# Patient Record
Sex: MALE | Race: WHITE | NOT HISPANIC OR LATINO | Employment: UNEMPLOYED | URBAN - NONMETROPOLITAN AREA
[De-identification: names, ages, dates, MRNs, and addresses within clinical notes are randomized per-mention and may not be internally consistent; named-entity substitution may affect disease eponyms.]

---

## 2019-03-17 PROCEDURE — 99284 EMERGENCY DEPT VISIT MOD MDM: CPT | Mod: 25 | Performed by: FAMILY MEDICINE

## 2019-03-17 PROCEDURE — 96374 THER/PROPH/DIAG INJ IV PUSH: CPT | Performed by: FAMILY MEDICINE

## 2019-03-17 PROCEDURE — 99283 EMERGENCY DEPT VISIT LOW MDM: CPT | Mod: Z6 | Performed by: FAMILY MEDICINE

## 2019-03-17 PROCEDURE — 96375 TX/PRO/DX INJ NEW DRUG ADDON: CPT | Performed by: FAMILY MEDICINE

## 2019-03-17 ASSESSMENT — MIFFLIN-ST. JEOR: SCORE: 1676.33

## 2019-03-18 ENCOUNTER — APPOINTMENT (OUTPATIENT)
Dept: CT IMAGING | Facility: OTHER | Age: 54
End: 2019-03-18
Attending: FAMILY MEDICINE

## 2019-03-18 ENCOUNTER — HOSPITAL ENCOUNTER (EMERGENCY)
Facility: OTHER | Age: 54
Discharge: HOME | End: 2019-03-18
Attending: FAMILY MEDICINE | Admitting: FAMILY MEDICINE

## 2019-03-18 VITALS
HEART RATE: 52 BPM | WEIGHT: 190 LBS | HEIGHT: 68 IN | TEMPERATURE: 98.3 F | OXYGEN SATURATION: 95 % | SYSTOLIC BLOOD PRESSURE: 138 MMHG | BODY MASS INDEX: 28.79 KG/M2 | DIASTOLIC BLOOD PRESSURE: 84 MMHG | RESPIRATION RATE: 15 BRPM

## 2019-03-18 DIAGNOSIS — S39.012A STRAIN OF LUMBAR REGION, INITIAL ENCOUNTER: ICD-10-CM

## 2019-03-18 LAB
ALBUMIN SERPL-MCNC: 4 G/DL (ref 3.5–5.7)
ALBUMIN UR-MCNC: NEGATIVE MG/DL
ALP SERPL-CCNC: 47 U/L (ref 34–104)
ALT SERPL W P-5'-P-CCNC: 20 U/L (ref 7–52)
ANION GAP SERPL CALCULATED.3IONS-SCNC: 7 MMOL/L (ref 3–14)
APPEARANCE UR: CLEAR
AST SERPL W P-5'-P-CCNC: 13 U/L (ref 13–39)
BASOPHILS # BLD AUTO: 0.1 10E9/L (ref 0–0.2)
BASOPHILS NFR BLD AUTO: 1.2 %
BILIRUB SERPL-MCNC: 0.3 MG/DL (ref 0.3–1)
BILIRUB UR QL STRIP: NEGATIVE
BUN SERPL-MCNC: 26 MG/DL (ref 7–25)
CALCIUM SERPL-MCNC: 8.9 MG/DL (ref 8.6–10.3)
CHLORIDE SERPL-SCNC: 106 MMOL/L (ref 98–107)
CO2 SERPL-SCNC: 27 MMOL/L (ref 21–31)
COLOR UR AUTO: YELLOW
CREAT SERPL-MCNC: 0.83 MG/DL (ref 0.7–1.3)
DIFFERENTIAL METHOD BLD: NORMAL
EOSINOPHIL # BLD AUTO: 0.6 10E9/L (ref 0–0.7)
EOSINOPHIL NFR BLD AUTO: 5.5 %
ERYTHROCYTE [DISTWIDTH] IN BLOOD BY AUTOMATED COUNT: 13.1 % (ref 10–15)
GFR SERPL CREATININE-BSD FRML MDRD: >90 ML/MIN/{1.73_M2}
GLUCOSE SERPL-MCNC: 109 MG/DL (ref 70–105)
GLUCOSE UR STRIP-MCNC: NEGATIVE MG/DL
HCT VFR BLD AUTO: 46.4 % (ref 40–53)
HGB BLD-MCNC: 15.2 G/DL (ref 13.3–17.7)
HGB UR QL STRIP: NEGATIVE
IMM GRANULOCYTES # BLD: 0 10E9/L (ref 0–0.4)
IMM GRANULOCYTES NFR BLD: 0.3 %
KETONES UR STRIP-MCNC: NEGATIVE MG/DL
LEUKOCYTE ESTERASE UR QL STRIP: NEGATIVE
LYMPHOCYTES # BLD AUTO: 4.2 10E9/L (ref 0.8–5.3)
LYMPHOCYTES NFR BLD AUTO: 40.8 %
MCH RBC QN AUTO: 30.1 PG (ref 26.5–33)
MCHC RBC AUTO-ENTMCNC: 32.8 G/DL (ref 31.5–36.5)
MCV RBC AUTO: 92 FL (ref 78–100)
MONOCYTES # BLD AUTO: 1.1 10E9/L (ref 0–1.3)
MONOCYTES NFR BLD AUTO: 10.7 %
NEUTROPHILS # BLD AUTO: 4.3 10E9/L (ref 1.6–8.3)
NEUTROPHILS NFR BLD AUTO: 41.5 %
NITRATE UR QL: NEGATIVE
PH UR STRIP: 6 PH (ref 5–9)
PLATELET # BLD AUTO: 215 10E9/L (ref 150–450)
POTASSIUM SERPL-SCNC: 4.5 MMOL/L (ref 3.5–5.1)
PROT SERPL-MCNC: 7 G/DL (ref 6.4–8.9)
RBC # BLD AUTO: 5.05 10E12/L (ref 4.4–5.9)
SODIUM SERPL-SCNC: 140 MMOL/L (ref 134–144)
SOURCE: NORMAL
SP GR UR STRIP: 1.01 (ref 1–1.03)
UROBILINOGEN UR STRIP-ACNC: 0.2 EU/DL (ref 0.2–1)
WBC # BLD AUTO: 10.4 10E9/L (ref 4–11)

## 2019-03-18 PROCEDURE — 36415 COLL VENOUS BLD VENIPUNCTURE: CPT | Performed by: FAMILY MEDICINE

## 2019-03-18 PROCEDURE — 96374 THER/PROPH/DIAG INJ IV PUSH: CPT | Performed by: FAMILY MEDICINE

## 2019-03-18 PROCEDURE — 81003 URINALYSIS AUTO W/O SCOPE: CPT | Performed by: FAMILY MEDICINE

## 2019-03-18 PROCEDURE — 74176 CT ABD & PELVIS W/O CONTRAST: CPT | Mod: TC

## 2019-03-18 PROCEDURE — 25000128 H RX IP 250 OP 636: Performed by: FAMILY MEDICINE

## 2019-03-18 PROCEDURE — 80053 COMPREHEN METABOLIC PANEL: CPT | Performed by: FAMILY MEDICINE

## 2019-03-18 PROCEDURE — 85025 COMPLETE CBC W/AUTO DIFF WBC: CPT | Performed by: FAMILY MEDICINE

## 2019-03-18 PROCEDURE — 96375 TX/PRO/DX INJ NEW DRUG ADDON: CPT | Performed by: FAMILY MEDICINE

## 2019-03-18 RX ORDER — ONDANSETRON 2 MG/ML
8 INJECTION INTRAMUSCULAR; INTRAVENOUS ONCE
Status: COMPLETED | OUTPATIENT
Start: 2019-03-18 | End: 2019-03-18

## 2019-03-18 RX ORDER — CYCLOBENZAPRINE HCL 10 MG
10 TABLET ORAL 3 TIMES DAILY PRN
Qty: 15 TABLET | Refills: 0 | Status: SHIPPED | OUTPATIENT
Start: 2019-03-18 | End: 2019-03-24

## 2019-03-18 RX ORDER — MORPHINE SULFATE 4 MG/ML
4 INJECTION, SOLUTION INTRAMUSCULAR; INTRAVENOUS
Status: DISCONTINUED | OUTPATIENT
Start: 2019-03-18 | End: 2019-03-18 | Stop reason: HOSPADM

## 2019-03-18 RX ORDER — KETOROLAC TROMETHAMINE 10 MG/1
10 TABLET, FILM COATED ORAL EVERY 6 HOURS PRN
Qty: 20 TABLET | Refills: 0 | Status: SHIPPED | OUTPATIENT
Start: 2019-03-18

## 2019-03-18 RX ORDER — PREDNISONE 20 MG/1
40 TABLET ORAL DAILY
Qty: 12 TABLET | Refills: 0 | Status: SHIPPED | OUTPATIENT
Start: 2019-03-18 | End: 2019-03-22

## 2019-03-18 RX ORDER — KETOROLAC TROMETHAMINE 15 MG/ML
15 INJECTION, SOLUTION INTRAMUSCULAR; INTRAVENOUS ONCE
Status: COMPLETED | OUTPATIENT
Start: 2019-03-18 | End: 2019-03-18

## 2019-03-18 RX ADMIN — ONDANSETRON 8 MG: 2 INJECTION INTRAMUSCULAR; INTRAVENOUS at 00:25

## 2019-03-18 RX ADMIN — KETOROLAC TROMETHAMINE 15 MG: 15 INJECTION, SOLUTION INTRAMUSCULAR; INTRAVENOUS at 00:28

## 2019-03-18 RX ADMIN — SODIUM CHLORIDE 1000 ML: 9 INJECTION, SOLUTION INTRAVENOUS at 00:24

## 2019-03-18 ASSESSMENT — ENCOUNTER SYMPTOMS
COUGH: 0
SHORTNESS OF BREATH: 0
DIARRHEA: 0
DIAPHORESIS: 0
HEADACHES: 0
FACIAL SWELLING: 0
VOMITING: 0
DYSURIA: 0
CHILLS: 0
ABDOMINAL PAIN: 0
FLANK PAIN: 1
LIGHT-HEADEDNESS: 0
PALPITATIONS: 0
SORE THROAT: 0
NAUSEA: 1
BACK PAIN: 1
FEVER: 0
DIFFICULTY URINATING: 0

## 2019-03-18 NOTE — ED PROVIDER NOTES
History     Chief Complaint   Patient presents with     Flank Pain     HPI  Alf Muniz is a 54 year old male who presnts to ED with left flank pain. Patient reports the pain began 22 hours ago and has worsened. The pain is located in the left flank area and described as an achy, 7 out of 10 for severity. The pain is exacerbated by movement and relieved by nothing. The patient complains of associated nausea. Denies associated, sweats, chills, URI symptoms, cough, chest pain, shortness of breath, abdominal pain, vomiting, diarrhea, dysuria, hematuria.  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  He states that the pain has been in the same area but has never been this severe before.. Patient denies other complaints    Allergies:  Allergies   Allergen Reactions     Penicillins Unknown       Problem List:    There are no active problems to display for this patient.       Past Medical History:    History reviewed. No pertinent past medical history.    Past Surgical History:    History reviewed. No pertinent surgical history.    Family History:    History reviewed. No pertinent family history.    Social History:  Marital Status:  Single [1]  Social History     Tobacco Use     Smoking status: Current Every Day Smoker     Packs/day: 0.50     Smokeless tobacco: Never Used   Substance Use Topics     Alcohol use: Yes     Comment: occasionally beer     Drug use: No        Medications:      cyclobenzaprine (FLEXERIL) 10 MG tablet   ketorolac (TORADOL) 10 MG tablet   predniSONE (DELTASONE) 20 MG tablet         Review of Systems   Constitutional: Negative for chills, diaphoresis and fever.   HENT: Negative for congestion, facial swelling and sore throat.    Eyes: Negative for visual disturbance.   Respiratory: Negative for cough and shortness of breath.    Cardiovascular: Negative for chest pain, palpitations and leg swelling.   Gastrointestinal: Positive for  "nausea. Negative for abdominal pain, diarrhea and vomiting.   Genitourinary: Positive for flank pain. Negative for difficulty urinating, dysuria and urgency.   Musculoskeletal: Positive for back pain.   Skin: Negative for rash.   Neurological: Negative for light-headedness and headaches.   All other systems reviewed and are negative.      Physical Exam   BP: (!) 186/94  Pulse: 64  Heart Rate: 67  Temp: 97.3  F (36.3  C)  Resp: 15  Height: 172.7 cm (5' 8\")  Weight: 86.2 kg (190 lb)  SpO2: 97 %      Physical Exam   Constitutional: He is oriented to person, place, and time. He appears well-developed and well-nourished. He is cooperative. He does not appear ill. No distress.   HENT:   Head: Normocephalic and atraumatic.   Right Ear: External ear normal.   Left Ear: External ear normal.   Nose: Nose normal.   Mouth/Throat: Oropharynx is clear and moist.   Eyes: Conjunctivae and EOM are normal. Pupils are equal, round, and reactive to light.   Neck: Normal range of motion. Neck supple.   Cardiovascular: Normal rate, regular rhythm, normal heart sounds and intact distal pulses.   No murmur heard.  Pulmonary/Chest: Effort normal and breath sounds normal. He has no rales.   Abdominal: Soft. Bowel sounds are normal. There is no hepatosplenomegaly. There is no tenderness. There is CVA tenderness. There is no rigidity, no rebound, no guarding, no tenderness at McBurney's point and negative Smith's sign.   Musculoskeletal: Normal range of motion. He exhibits no edema or tenderness.        Back:    Lymphadenopathy:     He has no cervical adenopathy.   Neurological: He is alert and oriented to person, place, and time.   Skin: Skin is warm. Capillary refill takes less than 2 seconds. No rash noted. He is not diaphoretic.   Nursing note and vitals reviewed.      ED Course     Procedures  Critical Care time:  none  Results for orders placed or performed during the hospital encounter of 03/18/19 (from the past 24 hour(s))   CBC with " platelets differential   Result Value Ref Range    WBC 10.4 4.0 - 11.0 10e9/L    RBC Count 5.05 4.4 - 5.9 10e12/L    Hemoglobin 15.2 13.3 - 17.7 g/dL    Hematocrit 46.4 40.0 - 53.0 %    MCV 92 78 - 100 fl    MCH 30.1 26.5 - 33.0 pg    MCHC 32.8 31.5 - 36.5 g/dL    RDW 13.1 10.0 - 15.0 %    Platelet Count 215 150 - 450 10e9/L    Diff Method Automated Method     % Neutrophils 41.5 %    % Lymphocytes 40.8 %    % Monocytes 10.7 %    % Eosinophils 5.5 %    % Basophils 1.2 %    % Immature Granulocytes 0.3 %    Absolute Neutrophil 4.3 1.6 - 8.3 10e9/L    Absolute Lymphocytes 4.2 0.8 - 5.3 10e9/L    Absolute Monocytes 1.1 0.0 - 1.3 10e9/L    Absolute Eosinophils 0.6 0.0 - 0.7 10e9/L    Absolute Basophils 0.1 0.0 - 0.2 10e9/L    Abs Immature Granulocytes 0.0 0 - 0.4 10e9/L   Comprehensive metabolic panel   Result Value Ref Range    Sodium 140 134 - 144 mmol/L    Potassium 4.5 3.5 - 5.1 mmol/L    Chloride 106 98 - 107 mmol/L    Carbon Dioxide 27 21 - 31 mmol/L    Anion Gap 7 3 - 14 mmol/L    Glucose 109 (H) 70 - 105 mg/dL    Urea Nitrogen 26 (H) 7 - 25 mg/dL    Creatinine 0.83 0.70 - 1.30 mg/dL    GFR Estimate >90 >60 mL/min/[1.73_m2]    GFR Estimate If Black >90 >60 mL/min/[1.73_m2]    Calcium 8.9 8.6 - 10.3 mg/dL    Bilirubin Total 0.3 0.3 - 1.0 mg/dL    Albumin 4.0 3.5 - 5.7 g/dL    Protein Total 7.0 6.4 - 8.9 g/dL    Alkaline Phosphatase 47 34 - 104 U/L    ALT 20 7 - 52 U/L    AST 13 13 - 39 U/L   CT Abdomen Pelvis without Contrast (stone protocol)    Narrative    EXAM:    CT Abdomen and Pelvis Without Contrast     EXAM DATE/TIME:    3/18/2019 12:54 AM     CLINICAL HISTORY:    54 years old, male; Signs and symptoms; Other: Flank pain, stone disease   suspected     TECHNIQUE:    Axial computed tomography images of the abdomen and pelvis without contrast.    All CT scans at this facility use at least one of these dose optimization   techniques: automated exposure control; mA and/or kV adjustment per patient   size (includes  targeted exams where dose is matched to clinical indication); or   iterative reconstruction.    Coronal and sagittal reformatted images were created and reviewed.     COMPARISON:    No relevant prior studies available.     FINDINGS:    Lower thorax: No acute findings.     ABDOMEN:    Liver: Normal. No mass.    Gallbladder and bile ducts: Normal. No calcified stones. No ductal dilation.    Pancreas: Normal. No ductal dilation.    Spleen: Normal. No splenomegaly.    Adrenals: Normal. No mass.    Kidneys and ureters: The kidneys are normal size. Renal cortical cyst   involving the right kidney measuring 3 cm in length. This has a density of 12   Hounsfield units. No hydronephrosis or nephrolithiasis. No hydroureter.   Stomach and bowel: Normal. No obstruction. No mucosal thickening. Occasional   colonic diverticuli. No evidence of acute diverticulitis.  Appendix: No evidence of appendicitis.     PELVIS:    Bladder: Unremarkable as visualized.    Reproductive: Unremarkable as visualized.     ABDOMEN and PELVIS:    Intraperitoneal space: Normal. No free air. No significant fluid collection.    Bones/joints: No acute fracture. No dislocation.    Soft tissues: Right-sided hydrocele.   Vasculature: Normal. No abdominal aortic aneurysm.    Lymph nodes: Normal. No enlarged lymph nodes.       Impression    IMPRESSION:   1. No evidence of hydronephrosis or nephrolithiasis.  2. Right hydrocele.    THIS DOCUMENT HAS BEEN ELECTRONICALLY SIGNED BY ETHAN DICKINSON MD   UA reflex to Microscopic and Culture   Result Value Ref Range    Color Urine Yellow     Appearance Urine Clear     Glucose Urine Negative NEG^Negative mg/dL    Bilirubin Urine Negative NEG^Negative    Ketones Urine Negative NEG^Negative mg/dL    Specific Gravity Urine 1.015 1.000 - 1.030    Blood Urine Negative NEG^Negative    pH Urine 6.0 5.0 - 9.0 pH    Protein Albumin Urine Negative NEG^Negative mg/dL    Urobilinogen Urine 0.2 0.2 - 1.0 EU/dL    Nitrite Urine Negative  NEG^Negative    Leukocyte Esterase Urine Negative NEG^Negative    Source Midstream Urine        Medications   morphine (PF) injection 4 mg (not administered)   0.9% sodium chloride BOLUS (0 mLs Intravenous Stopped 3/18/19 0244)   ondansetron (ZOFRAN) injection 8 mg (8 mg Intravenous Given 3/18/19 0025)   ketorolac (TORADOL) injection 15 mg (15 mg Intravenous Given 3/18/19 0028)       I had a discussion with the patient and the family regarding the symptoms, exam, laboratory, CT Scan results, diagnosis, and plan.  Labs are unremarkable.  CT scan is negative for stone disease or other acute findings.  Findings are consistent with lumbar myofascial strain.  The patient is discharged home in good condition.  Follow-up with primary care physician in 3-5 days.  Ice or heat to the area, stretching, he is given a prescription for Toradol, prednisone, Flexeril.  He may also use capsaicin cream which is available over-the-counter.  I answered all questions to the best of my ability.    The patient voiced understanding of the plan, was agreeable, and had no further questions or concerns. Advised to return for any worsening symptoms.      Assessments & Plan (with Medical Decision Making)     I have reviewed the nursing notes.    I have reviewed the findings, diagnosis, plan and need for follow up with the patient.       Medication List      Started    cyclobenzaprine 10 MG tablet  Commonly known as:  FLEXERIL  10 mg, Oral, 3 TIMES DAILY PRN     ketorolac 10 MG tablet  Commonly known as:  TORADOL  10 mg, Oral, EVERY 6 HOURS PRN     predniSONE 20 MG tablet  Commonly known as:  DELTASONE  40 mg, Oral, DAILY            Final diagnoses:   Strain of lumbar region, initial encounter       3/17/2019   [facility] \[facility]\""     Trye Grissom MD  03/18/19 8877

## 2019-03-18 NOTE — ED AVS SNAPSHOT
[facility]  [address]  [place] [address]  Phone:  [phone]  Fax:  [phone]                                    Alf Muniz   MRN: [id number]    Department:  [facility]   Date of Visit:  3/17/2019           After Visit Summary Signature Page    I have received my discharge instructions, and my questions have been answered. I have discussed any challenges I see with this plan with the nurse or doctor.    ..........................................................................................................................................  Patient/Patient Representative Signature      ..........................................................................................................................................  Patient Representative Print Name and Relationship to Patient    ..................................................               ................................................  Date                                   Time    ..........................................................................................................................................  Reviewed by Signature/Title    ...................................................              ..............................................  Date                                               Time          22EPIC Rev 08/18

## 2019-03-18 NOTE — ED TRIAGE NOTES
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  Increase in pain over the past three days.  Denies trouble with urination or bowel. No nausea or vomiting reported by patient   Significant symptoms had onset 3 day(s) ago.  BP (!) 186/94   Temp 97.3  F (36.3  C) (Tympanic)   Resp 15   Ht 1.727 m (5' 8\")   Wt 86.2 kg (190 lb)   SpO2 97%   BMI 28.89 kg/m  t  Patient appears alert , in moderate distress., and cooperative behavior.    GCS Total = 15  Airway: intact  Breathing noted as Normal.  Circulation Normal  Skin normal  Action taken:  To room 906.  Will collect urine      PRE HOSPITAL PRIOR LIVING SITUATION Alone  "

## 2019-03-18 NOTE — DISCHARGE INSTRUCTIONS
May use ice or heat to the area as you prefer.  Capsaicin cream over-the-counter may also help.  Do not take ibuprofen or Aleve while taking Toradol.

## 2019-07-03 ENCOUNTER — OFFICE VISIT (OUTPATIENT)
Dept: FAMILY MEDICINE | Facility: OTHER | Age: 54
End: 2019-07-03
Attending: NURSE PRACTITIONER

## 2019-07-03 VITALS
DIASTOLIC BLOOD PRESSURE: 84 MMHG | HEIGHT: 68 IN | HEART RATE: 60 BPM | TEMPERATURE: 97 F | SYSTOLIC BLOOD PRESSURE: 138 MMHG | RESPIRATION RATE: 16 BRPM | BODY MASS INDEX: 28.34 KG/M2 | WEIGHT: 187 LBS

## 2019-07-03 DIAGNOSIS — R10.84 ABDOMINAL PAIN, GENERALIZED: ICD-10-CM

## 2019-07-03 DIAGNOSIS — R12 HEART BURN: Primary | ICD-10-CM

## 2019-07-03 LAB
ALBUMIN SERPL-MCNC: 4.3 G/DL (ref 3.5–5.7)
ALP SERPL-CCNC: 50 U/L (ref 34–104)
ALT SERPL W P-5'-P-CCNC: 24 U/L (ref 7–52)
ANION GAP SERPL CALCULATED.3IONS-SCNC: 7 MMOL/L (ref 3–14)
AST SERPL W P-5'-P-CCNC: 19 U/L (ref 13–39)
BILIRUB SERPL-MCNC: 0.6 MG/DL (ref 0.3–1)
BUN SERPL-MCNC: 24 MG/DL (ref 7–25)
CALCIUM SERPL-MCNC: 9 MG/DL (ref 8.6–10.3)
CHLORIDE SERPL-SCNC: 107 MMOL/L (ref 98–107)
CO2 SERPL-SCNC: 26 MMOL/L (ref 21–31)
CREAT SERPL-MCNC: 0.85 MG/DL (ref 0.7–1.3)
ERYTHROCYTE [DISTWIDTH] IN BLOOD BY AUTOMATED COUNT: 12.7 % (ref 10–15)
GFR SERPL CREATININE-BSD FRML MDRD: >90 ML/MIN/{1.73_M2}
GLUCOSE SERPL-MCNC: 97 MG/DL (ref 70–105)
HCT VFR BLD AUTO: 43.8 % (ref 40–53)
HGB BLD-MCNC: 15.4 G/DL (ref 13.3–17.7)
MCH RBC QN AUTO: 30.7 PG (ref 26.5–33)
MCHC RBC AUTO-ENTMCNC: 35.2 G/DL (ref 31.5–36.5)
MCV RBC AUTO: 87 FL (ref 78–100)
PLATELET # BLD AUTO: 235 10E9/L (ref 150–450)
POTASSIUM SERPL-SCNC: 3.9 MMOL/L (ref 3.5–5.1)
PROT SERPL-MCNC: 6.8 G/DL (ref 6.4–8.9)
RBC # BLD AUTO: 5.02 10E12/L (ref 4.4–5.9)
SODIUM SERPL-SCNC: 140 MMOL/L (ref 134–144)
WBC # BLD AUTO: 11.1 10E9/L (ref 4–11)

## 2019-07-03 PROCEDURE — 80053 COMPREHEN METABOLIC PANEL: CPT | Mod: ZL | Performed by: NURSE PRACTITIONER

## 2019-07-03 PROCEDURE — 85027 COMPLETE CBC AUTOMATED: CPT | Mod: ZL | Performed by: NURSE PRACTITIONER

## 2019-07-03 PROCEDURE — 99213 OFFICE O/P EST LOW 20 MIN: CPT | Performed by: NURSE PRACTITIONER

## 2019-07-03 PROCEDURE — 36415 COLL VENOUS BLD VENIPUNCTURE: CPT | Mod: ZL | Performed by: NURSE PRACTITIONER

## 2019-07-03 RX ORDER — OMEPRAZOLE 40 MG/1
40 CAPSULE, DELAYED RELEASE ORAL DAILY
Qty: 30 CAPSULE | Refills: 0 | Status: SHIPPED | OUTPATIENT
Start: 2019-07-03 | End: 2019-08-02

## 2019-07-03 RX ORDER — OMEPRAZOLE 20 MG/1
20 TABLET, DELAYED RELEASE ORAL DAILY
Qty: 14 TABLET | Refills: 0 | Status: SHIPPED | OUTPATIENT
Start: 2019-07-03 | End: 2019-07-17

## 2019-07-03 ASSESSMENT — ENCOUNTER SYMPTOMS
ABDOMINAL PAIN: 1
NAUSEA: 1
CONSTIPATION: 0
DIARRHEA: 0

## 2019-07-03 ASSESSMENT — PAIN SCALES - GENERAL: PAINLEVEL: EXTREME PAIN (8)

## 2019-07-03 ASSESSMENT — MIFFLIN-ST. JEOR: SCORE: 1662.73

## 2019-07-03 NOTE — NURSING NOTE
Patient presents to clinic experiencing daily random abdominal pain rated at 8 for past 2 weeks.  Bowel movements reported daily.    Medication Reconciliation: complete    Deborah Euceda LPN
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
## 2019-07-05 ASSESSMENT — ENCOUNTER SYMPTOMS
CHILLS: 0
HEMATOCHEZIA: 0
ABDOMINAL DISTENTION: 0
FEVER: 0
UNEXPECTED WEIGHT CHANGE: 0
RECTAL PAIN: 0
VOMITING: 0
APPETITE CHANGE: 0
HEARTBURN: 1

## 2019-12-27 ENCOUNTER — TRANSFERRED RECORDS (OUTPATIENT)
Dept: HEALTH INFORMATION MANAGEMENT | Facility: OTHER | Age: 54
End: 2019-12-27

## 2020-02-24 ENCOUNTER — HOSPITAL ENCOUNTER (OUTPATIENT)
Dept: MRI IMAGING | Facility: OTHER | Age: 55
End: 2020-02-24
Attending: NURSE PRACTITIONER
Payer: OTHER MISCELLANEOUS

## 2020-02-24 ENCOUNTER — HOSPITAL ENCOUNTER (OUTPATIENT)
Dept: GENERAL RADIOLOGY | Facility: OTHER | Age: 55
Discharge: HOME | End: 2020-02-24
Attending: NURSE PRACTITIONER | Admitting: FAMILY MEDICINE
Payer: OTHER MISCELLANEOUS

## 2020-02-24 DIAGNOSIS — M25.60 JOINT MOVEMENT RESTRICTED: ICD-10-CM

## 2020-02-24 DIAGNOSIS — S73.199A ACETABULAR LABRUM TEAR: ICD-10-CM

## 2020-02-24 DIAGNOSIS — M75.102 ROTATOR CUFF TEAR, LEFT: ICD-10-CM

## 2020-02-24 DIAGNOSIS — M25.612 SHOULDER JOINT STIFFNESS, LEFT: ICD-10-CM

## 2020-02-24 PROCEDURE — 25500064 ZZH RX 255 OP 636: Performed by: RADIOLOGY

## 2020-02-24 PROCEDURE — 73222 MRI JOINT UPR EXTREM W/DYE: CPT | Mod: LT

## 2020-02-24 PROCEDURE — 77002 NEEDLE LOCALIZATION BY XRAY: CPT

## 2020-02-24 PROCEDURE — A9575 INJ GADOTERATE MEGLUMI 0.1ML: HCPCS | Performed by: RADIOLOGY

## 2020-02-24 PROCEDURE — 25000125 ZZHC RX 250: Performed by: RADIOLOGY

## 2020-02-24 RX ORDER — LIDOCAINE HYDROCHLORIDE 10 MG/ML
2 INJECTION, SOLUTION EPIDURAL; INFILTRATION; INTRACAUDAL; PERINEURAL ONCE
Status: COMPLETED | OUTPATIENT
Start: 2020-02-24 | End: 2020-02-24

## 2020-02-24 RX ORDER — GADOTERATE MEGLUMINE 376.9 MG/ML
0.1 INJECTION INTRAVENOUS ONCE
Status: COMPLETED | OUTPATIENT
Start: 2020-02-24 | End: 2020-02-24

## 2020-02-24 RX ADMIN — LIDOCAINE HYDROCHLORIDE 2 ML: 10 INJECTION, SOLUTION INFILTRATION; PERINEURAL at 15:36

## 2020-02-24 RX ADMIN — IOHEXOL 5 ML: 240 INJECTION, SOLUTION INTRATHECAL; INTRAVASCULAR; INTRAVENOUS; ORAL at 15:35

## 2020-02-24 RX ADMIN — GADOTERATE MEGLUMINE 0.1 ML: 376.9 INJECTION INTRAVENOUS at 15:35

## 2020-05-18 ENCOUNTER — OFFICE VISIT (OUTPATIENT)
Dept: INTERNAL MEDICINE | Facility: OTHER | Age: 55
End: 2020-05-18
Attending: INTERNAL MEDICINE
Payer: OTHER MISCELLANEOUS

## 2020-05-18 ENCOUNTER — HOSPITAL ENCOUNTER (OUTPATIENT)
Dept: GENERAL RADIOLOGY | Facility: OTHER | Age: 55
End: 2020-05-18
Attending: INTERNAL MEDICINE
Payer: OTHER MISCELLANEOUS

## 2020-05-18 VITALS
HEART RATE: 67 BPM | DIASTOLIC BLOOD PRESSURE: 88 MMHG | RESPIRATION RATE: 16 BRPM | TEMPERATURE: 97.8 F | WEIGHT: 158 LBS | OXYGEN SATURATION: 100 % | BODY MASS INDEX: 24.02 KG/M2 | SYSTOLIC BLOOD PRESSURE: 150 MMHG

## 2020-05-18 DIAGNOSIS — F32.9 REACTIVE DEPRESSION: ICD-10-CM

## 2020-05-18 DIAGNOSIS — Z01.818 PREOPERATIVE EXAMINATION: ICD-10-CM

## 2020-05-18 DIAGNOSIS — S49.92XS INJURY OF LEFT SHOULDER, SEQUELA: Primary | ICD-10-CM

## 2020-05-18 DIAGNOSIS — F17.210 CIGARETTE SMOKER: ICD-10-CM

## 2020-05-18 DIAGNOSIS — I10 ESSENTIAL HYPERTENSION: ICD-10-CM

## 2020-05-18 LAB
ANION GAP SERPL CALCULATED.3IONS-SCNC: 4 MMOL/L (ref 3–14)
BUN SERPL-MCNC: 17 MG/DL (ref 7–25)
CALCIUM SERPL-MCNC: 9.1 MG/DL (ref 8.6–10.3)
CHLORIDE SERPL-SCNC: 106 MMOL/L (ref 98–107)
CHOLEST SERPL-MCNC: 188 MG/DL
CO2 SERPL-SCNC: 30 MMOL/L (ref 21–31)
CREAT SERPL-MCNC: 0.77 MG/DL (ref 0.7–1.3)
ERYTHROCYTE [DISTWIDTH] IN BLOOD BY AUTOMATED COUNT: 13.2 % (ref 10–15)
GFR SERPL CREATININE-BSD FRML MDRD: >90 ML/MIN/{1.73_M2}
GLUCOSE SERPL-MCNC: 100 MG/DL (ref 70–105)
HCT VFR BLD AUTO: 46.1 % (ref 40–53)
HDLC SERPL-MCNC: 53 MG/DL (ref 23–92)
HGB BLD-MCNC: 15.8 G/DL (ref 13.3–17.7)
INTERPRETATION ECG - MUSE: NORMAL
LDLC SERPL CALC-MCNC: 112 MG/DL
MCH RBC QN AUTO: 30.2 PG (ref 26.5–33)
MCHC RBC AUTO-ENTMCNC: 34.3 G/DL (ref 31.5–36.5)
MCV RBC AUTO: 88 FL (ref 78–100)
NONHDLC SERPL-MCNC: 135 MG/DL
PLATELET # BLD AUTO: 255 10E9/L (ref 150–450)
POTASSIUM SERPL-SCNC: 4.2 MMOL/L (ref 3.5–5.1)
RBC # BLD AUTO: 5.23 10E12/L (ref 4.4–5.9)
SODIUM SERPL-SCNC: 140 MMOL/L (ref 134–144)
TRIGL SERPL-MCNC: 117 MG/DL
WBC # BLD AUTO: 10.8 10E9/L (ref 4–11)

## 2020-05-18 PROCEDURE — 90715 TDAP VACCINE 7 YRS/> IM: CPT | Performed by: INTERNAL MEDICINE

## 2020-05-18 PROCEDURE — 93010 ELECTROCARDIOGRAM REPORT: CPT | Performed by: INTERNAL MEDICINE

## 2020-05-18 PROCEDURE — 90471 IMMUNIZATION ADMIN: CPT | Performed by: INTERNAL MEDICINE

## 2020-05-18 PROCEDURE — 99215 OFFICE O/P EST HI 40 MIN: CPT | Mod: 25 | Performed by: INTERNAL MEDICINE

## 2020-05-18 PROCEDURE — 87635 SARS-COV-2 COVID-19 AMP PRB: CPT | Mod: ZL | Performed by: INTERNAL MEDICINE

## 2020-05-18 PROCEDURE — 36415 COLL VENOUS BLD VENIPUNCTURE: CPT | Mod: ZL | Performed by: INTERNAL MEDICINE

## 2020-05-18 PROCEDURE — 71046 X-RAY EXAM CHEST 2 VIEWS: CPT

## 2020-05-18 PROCEDURE — 80048 BASIC METABOLIC PNL TOTAL CA: CPT | Mod: ZL | Performed by: INTERNAL MEDICINE

## 2020-05-18 PROCEDURE — 80061 LIPID PANEL: CPT | Mod: ZL | Performed by: INTERNAL MEDICINE

## 2020-05-18 PROCEDURE — 85027 COMPLETE CBC AUTOMATED: CPT | Mod: ZL | Performed by: INTERNAL MEDICINE

## 2020-05-18 SDOH — HEALTH STABILITY: MENTAL HEALTH: HOW OFTEN DO YOU HAVE A DRINK CONTAINING ALCOHOL?: 2-4 TIMES A MONTH

## 2020-05-18 SDOH — HEALTH STABILITY: MENTAL HEALTH: HOW MANY STANDARD DRINKS CONTAINING ALCOHOL DO YOU HAVE ON A TYPICAL DAY?: 1 OR 2

## 2020-05-18 ASSESSMENT — ENCOUNTER SYMPTOMS
DYSPHORIC MOOD: 1
DIZZINESS: 0
CONFUSION: 0
HEADACHES: 0
APPETITE CHANGE: 0
SORE THROAT: 0
VOICE CHANGE: 0
BACK PAIN: 0
CHEST TIGHTNESS: 0
SPEECH DIFFICULTY: 0
SLEEP DISTURBANCE: 0
SINUS PAIN: 0
ARTHRALGIAS: 1
TREMORS: 0
FATIGUE: 0
COUGH: 0
ABDOMINAL PAIN: 0
VOMITING: 0
SHORTNESS OF BREATH: 0
BLOOD IN STOOL: 0
NAUSEA: 0
BRUISES/BLEEDS EASILY: 0
FREQUENCY: 0
NERVOUS/ANXIOUS: 0
TROUBLE SWALLOWING: 0
ABDOMINAL DISTENTION: 0
EYE PAIN: 0
WOUND: 0
EYE REDNESS: 0
NUMBNESS: 0
DIARRHEA: 0
SEIZURES: 0
FEVER: 0
CHILLS: 0
UNEXPECTED WEIGHT CHANGE: 0
WEAKNESS: 0
SINUS PRESSURE: 0
CONSTIPATION: 0
HALLUCINATIONS: 0
AGITATION: 0
PALPITATIONS: 0
WHEEZING: 0
JOINT SWELLING: 0
NECK PAIN: 0
LIGHT-HEADEDNESS: 0
ADENOPATHY: 0
NECK STIFFNESS: 0
HEMATURIA: 0
RHINORRHEA: 0
DYSURIA: 0
DIAPHORESIS: 0
DIFFICULTY URINATING: 0
FLANK PAIN: 0

## 2020-05-18 ASSESSMENT — PATIENT HEALTH QUESTIONNAIRE - PHQ9: SUM OF ALL RESPONSES TO PHQ QUESTIONS 1-9: 14

## 2020-05-18 ASSESSMENT — PAIN SCALES - GENERAL: PAINLEVEL: NO PAIN (1)
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
## 2020-05-18 NOTE — NURSING NOTE
Alf Muniz is a 55 year old male presenting today for: pre-op, Dr. Sami Fam 5/21/20, left shoulder surgery  Medication Reconciliation: complete    Melina Tejada LPN 5/18/2020 8:51 AM

## 2020-05-18 NOTE — LETTER
May 18, 2020      Alf Muniz  [address] [address]        Dear Alf,    Below are the results of your recent labs:    Results for orders placed or performed during the hospital encounter of 05/18/20   XR Chest 2 Views     Status: None    Narrative    PROCEDURE:  XR CHEST 2 VW    HISTORY:  Preoperative examination.     COMPARISON:  None.    FINDINGS:   The cardiac silhouette is normal in size. The pulmonary vasculature is  normal.  The lungs are clear. No pleural effusion or pneumothorax.      Impression    IMPRESSION:  No acute cardiopulmonary disease.      KACEY FALL MD   Results for orders placed or performed in visit on 05/18/20   Lipid Panel     Status: Abnormal   Result Value Ref Range    Cholesterol 188 <200 mg/dL    Triglycerides 117 <150 mg/dL    HDL Cholesterol 53 23 - 92 mg/dL    LDL Cholesterol Calculated 112 (H) <100 mg/dL    Non HDL Cholesterol 135 (H) <130 mg/dL   CBC W PLT No Diff     Status: None   Result Value Ref Range    WBC 10.8 4.0 - 11.0 10e9/L    RBC Count 5.23 4.4 - 5.9 10e12/L    Hemoglobin 15.8 13.3 - 17.7 g/dL    Hematocrit 46.1 40.0 - 53.0 %    MCV 88 78 - 100 fl    MCH 30.2 26.5 - 33.0 pg    MCHC 34.3 31.5 - 36.5 g/dL    RDW 13.2 10.0 - 15.0 %    Platelet Count 255 150 - 450 10e9/L   Basic Metabolic Panel     Status: None   Result Value Ref Range    Sodium 140 134 - 144 mmol/L    Potassium 4.2 3.5 - 5.1 mmol/L    Chloride 106 98 - 107 mmol/L    Carbon Dioxide 30 21 - 31 mmol/L    Anion Gap 4 3 - 14 mmol/L    Glucose 100 70 - 105 mg/dL    Urea Nitrogen 17 7 - 25 mg/dL    Creatinine 0.77 0.70 - 1.30 mg/dL    GFR Estimate >90 >60 mL/min/[1.73_m2]    GFR Estimate If Black >90 >60 mL/min/[1.73_m2]    Calcium 9.1 8.6 - 10.3 mg/dL   EKG 12-lead complete w/read - Clinics     Status: None   Result Value Ref Range    Interpretation ECG Click View Image link to view waveform and result         Your blood tests look fine.  Congratulations on this  report.    Sincerely,        Jeffry Carroll MD  Internal Medicine  [facility]

## 2020-05-19 LAB
SARS-COV-2 RNA SPEC QL NAA+PROBE: NOT DETECTED
SPECIMEN SOURCE: NORMAL

## 2020-05-22 ENCOUNTER — TELEPHONE (OUTPATIENT)
Dept: INTERNAL MEDICINE | Facility: OTHER | Age: 55
End: 2020-05-22

## 2020-05-22 NOTE — TELEPHONE ENCOUNTER
Left detailed message according to Dr Carroll's letter.  Melina Tejada LPN on 5/22/2020 at 10:35 AM

## 2020-05-22 NOTE — TELEPHONE ENCOUNTER
Reason for call: Request for results.    Name of test or procedure: All from last week    Date of test or procedure: 5/18/2020    Location of test or procedure: [facility]    Preferred method for responding to this message: Telephone Call    Phone number patient can be reached at: Cell number on file:    Telephone Information:   Mobile [phone]       If we can't reach you directly, may we leave a detailed response at the number you provided?Yes

## 2020-05-27 ENCOUNTER — TELEPHONE (OUTPATIENT)
Dept: INTERNAL MEDICINE | Facility: OTHER | Age: 55
End: 2020-05-27

## 2020-05-27 NOTE — TELEPHONE ENCOUNTER
Sapna from [facility] said this fax and matter are no longer relevant.  No more action needed.  Melina Tejada LPN on 5/27/2020 at 1:13 PM

## 2020-05-27 NOTE — TELEPHONE ENCOUNTER
Sapna Wood from [facility] faxed a questionnaire about the patient's pre-op exam, but the date of the surgery is incorrect on the form.  Want to call patient to clarify the date of the surgery.  Melina Tejada LPN on 5/27/2020 at 11:50 AM

## 2020-05-27 NOTE — TELEPHONE ENCOUNTER
Patient stated that the surgery was on 5/21/2020.  He is not sure of this  but if she's involved in workman's comp it's ok to share information.  Melina Tejada LPN on 5/27/2020 at 11:58 AM

## 2021-05-13 ENCOUNTER — NURSE TRIAGE (OUTPATIENT)
Dept: FAMILY MEDICINE | Facility: OTHER | Age: 56
End: 2021-05-13

## 2021-05-13 DIAGNOSIS — Z20.822 SUSPECTED 2019 NOVEL CORONAVIRUS INFECTION: Primary | ICD-10-CM

## 2021-05-13 NOTE — TELEPHONE ENCOUNTER
Reason for Disposition    [1] COVID-19 infection suspected by caller or triager AND [2] mild symptoms (cough, fever, or others) AND [3] no complications or SOB    Additional Information    Negative: SEVERE difficulty breathing (e.g., struggling for each breath, speaks in single words)    Negative: Difficult to awaken or acting confused (e.g., disoriented, slurred speech)    Negative: Bluish (or gray) lips or face now    Negative: Shock suspected (e.g., cold/pale/clammy skin, too weak to stand, low BP, rapid pulse)    Negative: Sounds like a life-threatening emergency to the triager    Negative: [1] COVID-19 exposure AND [2] has not completed COVID-19 vaccine series AND [3] no symptoms    Negative: [1] COVID-19 exposure AND [2] completed COVID-19 vaccine series (fully vaccinated) AND [3] no symptoms    Negative: COVID-19 vaccine reaction suspected (e.g., fever, headache, muscle aches) occurring during days 1-3 after getting vaccine    Negative: COVID-19 vaccine, questions about    Negative: [1] COVID-19 vaccine series completed (fully vaccinated) in past 3 months AND [2] new-onset of COVID-19 symptoms BUT [3] no known exposure    Negative: [1] Had lab test confirmed COVID-19 infection within last 3 monthsAND [2] new-onset of COVID-19 symptoms BUT [3] no known exposure    Negative: [1] Lives with someone known to have influenza (flu test positive) AND [2] flu-like symptoms (e.g., cough, runny nose, sore throat, SOB; with or without fever)    Negative: [1] Adult with possible COVID-19 symptoms AND [2] triager concerned about severity of symptoms or other causes    Negative: COVID-19 and breastfeeding, questions about    Negative: SEVERE or constant chest pain or pressure (Exception: mild central chest pain, present only when coughing)    Negative: MODERATE difficulty breathing (e.g., speaks in phrases, SOB even at rest, pulse 100-120)    Negative: [1] Headache AND [2] stiff neck (can't touch chin to chest)     "Negative: MILD difficulty breathing (e.g., minimal/no SOB at rest, SOB with walking, pulse <100)    Negative: Chest pain or pressure    Negative: Patient sounds very sick or weak to the triager    Negative: Fever > 103 F (39.4 C)    Negative: [1] Fever > 101 F (38.3 C) AND [2] age > 60 years    Negative: [1] Fever > 100.0 F (37.8 C) AND [2] bedridden (e.g., nursing home patient, CVA, chronic illness, recovering from surgery)    Negative: [1] HIGH RISK patient (e.g., age > 64 years, diabetes, heart or lung disease, weak immune system) AND [2] new or worsening symptoms    Negative: [1] HIGH RISK patient AND [2] influenza is widespread in the community AND [3] ONE OR MORE respiratory symptoms: cough, sore throat, runny or stuffy nose    Negative: [1] HIGH RISK patient AND [2] influenza exposure within the last 7 days AND [3] ONE OR MORE respiratory symptoms: cough, sore throat, runny or stuffy nose    Negative: Fever present > 3 days (72 hours)    Negative: [1] Fever returns after gone for over 24 hours AND [2] symptoms worse or not improved    Negative: [1] Continuous (nonstop) coughing interferes with work or school AND [2] no improvement using cough treatment per protocol    Answer Assessment - Initial Assessment Questions  1. COVID-19 DIAGNOSIS: \"Who made your Coronavirus (COVID-19) diagnosis?\" \"Was it confirmed by a positive lab test?\" If not diagnosed by a HCP, ask \"Are there lots of cases (community spread) where you live?\" (See public health department website, if unsure)      Not confirmed  2. COVID-19 EXPOSURE: \"Was there any known exposure to COVID before the symptoms began?\" CDC Definition of close contact: within 6 feet (2 meters) for a total of 15 minutes or more over a 24-hour period.       no  3. ONSET: \"When did the COVID-19 symptoms start?\"       Tuesday 5/11  4. WORST SYMPTOM: \"What is your worst symptom?\" (e.g., cough, fever, shortness of breath, muscle aches)      None, improved   5. COUGH: \"Do you " "have a cough?\" If so, ask: \"How bad is the cough?\"        no  6. FEVER: \"Do you have a fever?\" If so, ask: \"What is your temperature, how was it measured, and when did it start?\"      no  7. RESPIRATORY STATUS: \"Describe your breathing?\" (e.g., shortness of breath, wheezing, unable to speak)       no  8. BETTER-SAME-WORSE: \"Are you getting better, staying the same or getting worse compared to yesterday?\"  If getting worse, ask, \"In what way?\"      better  9. HIGH RISK DISEASE: \"Do you have any chronic medical problems?\" (e.g., asthma, heart or lung disease, weak immune system, obesity, etc.)      no  10. PREGNANCY: \"Is there any chance you are pregnant?\" \"When was your last menstrual period?\"        na  11. OTHER SYMPTOMS: \"Do you have any other symptoms?\"  (e.g., chills, fatigue, headache, loss of smell or taste, muscle pain, sore throat; new loss of smell or taste especially support the diagnosis of COVID-19)        Nausea, vomiting, dizziness    Protocols used: CORONAVIRUS (COVID-19) DIAGNOSED OR [id number] 3.25      "

## 2025-01-15 ENCOUNTER — TELEPHONE (OUTPATIENT)
Dept: FAMILY MEDICINE | Facility: OTHER | Age: 60
End: 2025-01-15

## 2025-01-15 NOTE — TELEPHONE ENCOUNTER
Patient states that he was seen and treated for influenza A last week. States that he is on the last day of the medications and is still not feeling better. Wants to know if he should go in an be seen again    Lori Marin on 1/15/2025 at 8:55 AM

## 2025-01-15 NOTE — TELEPHONE ENCOUNTER
"Called and spoke with patient .  Patient states that he will be taking his last dose of \"medication\" today.  States the last 2 days was bad.   States today so far he is better.  Patient seen in [facility] on 1/10/25 and started on Tamiflu for Influenza A.  Reviewed with patient OV note  \"Follow up if symptoms persist or worsen or concerns \"    Patient verbalized understanding and states he will see how he does today and if his symptoms persist or are like they were yesterday he will follow up.  Lupe Harris RN on 1/15/2025 at 10:09 AM      "

## 2025-01-20 ENCOUNTER — OFFICE VISIT (OUTPATIENT)
Dept: FAMILY MEDICINE | Facility: OTHER | Age: 60
End: 2025-01-20

## 2025-01-20 ENCOUNTER — HOSPITAL ENCOUNTER (OUTPATIENT)
Dept: GENERAL RADIOLOGY | Facility: OTHER | Age: 60
Discharge: HOME | End: 2025-01-20

## 2025-01-20 VITALS
BODY MASS INDEX: 26.36 KG/M2 | WEIGHT: 158.2 LBS | HEART RATE: 79 BPM | DIASTOLIC BLOOD PRESSURE: 80 MMHG | TEMPERATURE: 98.7 F | HEIGHT: 65 IN | OXYGEN SATURATION: 95 % | SYSTOLIC BLOOD PRESSURE: 149 MMHG | RESPIRATION RATE: 18 BRPM

## 2025-01-20 DIAGNOSIS — J18.9 PNEUMONIA OF LEFT LOWER LOBE DUE TO INFECTIOUS ORGANISM: Primary | ICD-10-CM

## 2025-01-20 DIAGNOSIS — R05.8 PRODUCTIVE COUGH: ICD-10-CM

## 2025-01-20 PROCEDURE — 99214 OFFICE O/P EST MOD 30 MIN: CPT

## 2025-01-20 PROCEDURE — 71046 X-RAY EXAM CHEST 2 VIEWS: CPT

## 2025-01-20 RX ORDER — CEFDINIR 300 MG/1
300 CAPSULE ORAL 2 TIMES DAILY
Qty: 14 CAPSULE | Refills: 0 | Status: SHIPPED | OUTPATIENT
Start: 2025-01-20 | End: 2025-01-27

## 2025-01-20 RX ORDER — AZITHROMYCIN 250 MG/1
TABLET, FILM COATED ORAL
Qty: 6 TABLET | Refills: 0 | Status: SHIPPED | OUTPATIENT
Start: 2025-01-20 | End: 2025-01-25

## 2025-01-20 RX ORDER — CODEINE PHOSPHATE AND GUAIFENESIN 10; 100 MG/5ML; MG/5ML
1-2 SOLUTION ORAL EVERY 6 HOURS PRN
Qty: 118 ML | Refills: 0 | Status: SHIPPED | OUTPATIENT
Start: 2025-01-20

## 2025-01-20 ASSESSMENT — PAIN SCALES - GENERAL: PAINLEVEL_OUTOF10: NO PAIN (0)

## 2025-01-20 NOTE — PROGRESS NOTES
ASSESSMENT/PLAN:    I have reviewed the nursing notes.  I have reviewed the findings, diagnosis, plan and need for follow up with the patient.    1. Pneumonia of left lower lobe due to infectious organism (Primary)  2. Productive cough  - XR Chest 2 Views  - cefdinir (OMNICEF) 300 MG capsule; Take 1 capsule (300 mg) by mouth 2 times daily for 7 days.  Dispense: 14 capsule; Refill: 0  - azithromycin (ZITHROMAX) 250 MG tablet; Take 2 tablets (500 mg) by mouth daily for 1 day, THEN 1 tablet (250 mg) daily for 4 days.  Dispense: 6 tablet; Refill: 0  - guaiFENesin-codeine (ROBITUSSIN AC) 100-10 MG/5ML solution; Take 5-10 mLs by mouth every 6 hours as needed for cough.  Dispense: 118 mL; Refill: 0    Patient presents with a persistent acute illness with systemic symptoms including body aches.  His vitals are currently stable and he appears nontoxic.  Patient just recently was treated for influenza A but his symptoms continue to persist.  Chest x-ray indicates left lower lobe pneumonia.  Will treat with Omnicef and azithromycin.  Also provided patient with a prescription for Robitussin AC to take as needed for his cough as he has had difficulty sleeping at night due to his cough. Discussed symptomatic treatment - Encouraged fluids, salt water gargles, honey (only if greater than 1 year in age due to risk of botulism), elevation, humidifier, sinus rinse/netti pot, lozenges, tea, topical vapor rub, popsicles, rest, etc. May use over-the-counter Tylenol or ibuprofen PRN.    Discussed warning signs/symptoms indicative of need to f/u    Follow up if symptoms persist or worsen or concerns    I explained my diagnostic considerations and recommendations to the patient, who voiced understanding and agreement with the treatment plan. All questions were answered. We discussed potential side effects of any prescribed or recommended therapies, as well as expectations for response to treatments.    CHERYL Meek  CNP  1/20/2025  12:44 PM    HPI:    Alf Muniz is a 59 year old male who [facility] for concerns of URI symptoms    URI, x 11 days    Symptoms:  No fevers or chills.   No sore throat/pharyngitis/tonsillitis.   YES: +  allergy/URI Symptoms  No muffled sounds/change in hearing  No sensation of fullness in ear(s)  No ringing in ears/tinnitus  No dizziness  No congestion (head/nasal/chest)  YES: +  cough/productive cough  No post nasal drip   No headache  No sinus pain/pressure  YES: +  myalgias  No otalgia  No rash  Activity Level Changes: Yes: fatigue  Appetite/Liquid Intake Changes: Yes: decreased  Changes to Bowel Habits: No  Changes to Bladder Habits: No  Additional Symptoms to Report: Yes: chest tightness, wheezing  Prior workup: Yes: Patient was [facility] in the [facility] on 1/10/2025 and tested positive for influenza A, he was treated with Tamiflu and completed his treatment    Treatments tried: OTC Cough med, Fluids, and Rest    Other Pertinent History: tobacco use    Allergies: penicillins    PCP: none    History reviewed. No pertinent past medical history.  Past Surgical History:   Procedure Laterality Date    LAPAROTOMY EXPLORATORY  1995    Secondary to trauma     Social History     Tobacco Use    Smoking status: Every Day     Current packs/day: 0.50     Types: Cigarettes    Smokeless tobacco: Never   Substance Use Topics    Alcohol use: Yes     Comment: occasionally beer     Current Outpatient Medications   Medication Sig Dispense Refill    ketorolac (TORADOL) 10 MG tablet Take 1 tablet (10 mg) by mouth every 6 hours as needed for moderate pain (Patient not taking: Reported on 1/20/2025) 20 tablet 0    sertraline (ZOLOFT) 50 MG tablet Take 1 tablet (50 mg) by mouth daily (Patient not taking: Reported on 1/20/2025) 30 tablet 1     Allergies   Allergen Reactions    Pcn [Penicillins] Unknown     Past medical history, past surgical history, current medications and allergies reviewed and  "accurate to the best of my knowledge.      ROS:  Refer to HPI    BP (!) 149/80 (BP Location: Right arm, Patient Position: Sitting, Cuff Size: Adult Regular)   Pulse 79   Temp 98.7  F (37.1  C) (Temporal)   Resp 18   Ht 1.651 m (5' 5\")   Wt 71.8 kg (158 lb 3.2 oz)   SpO2 95%   BMI 26.33 kg/m      EXAM:  General Appearance: Well appearing 59 year old male, appropriate appearance for age. No acute distress   Ears: Left TM intact, translucent with bony landmarks appreciated, no erythema, no effusion, no bulging, no purulence.  Right TM intact, translucent with bony landmarks appreciated, no erythema, no effusion, no bulging, no purulence.  Left auditory canal clear.  Right auditory canal clear.  Normal external ears, non tender.  Eyes: conjunctivae normal without erythema or irritation, corneas clear, no drainage or crusting, no eyelid swelling, pupils equal   Oropharynx: moist mucous membranes, posterior pharynx without erythema, tonsils symmetric and 1+, no erythema, no exudates or petechiae, no post nasal drip seen, no trismus, voice clear.    Nose:  Bilateral nares: no erythema, no edema, no drainage or congestion   Neck: supple without adenopathy  Respiratory: normal chest wall and respirations.  Normal effort.  Mild wheezing in upper lobes, no crackles or rhonchi.  No increased work of breathing.  Moderate cough appreciated.  Cardiac: RRR with no murmurs  Musculoskeletal:  Equal movement of bilateral upper extremities.  Equal movement of bilateral lower extremities.  Normal gait.    Dermatological: no rashes noted of exposed skin  Neuro: Alert and oriented to person, place, and time.    Psychological: normal affect, alert, oriented, and pleasant.     Xray:  Results for orders placed or performed in visit on 01/20/25   XR Chest 2 Views     Status: None    Narrative    PROCEDURE: XR CHEST 2 VIEWS 1/20/2025 1:07 PM    HISTORY: Productive cough    COMPARISONS: 5/18/2020.    TECHNIQUE: 2 views.    FINDINGS: " Heart and pulmonary vasculature are normal. There is  elongation of the thoracic aorta. Right lung and pleural space are  clear.    There is focal patchy airspace disease at the left lung base, most  consistent with left lower lobe pneumonia.         Impression    IMPRESSION: Left lower lobe pneumonia.    KIRA VILLALBA MD         SYSTEM ID:  [id number]

## 2025-01-20 NOTE — NURSING NOTE
"Chief Complaint   Patient presents with    Cough     Productive X10 days    [facility] today for concerns of a cough. Patient reports cough X10 days and is productive. Patient notes chest pain when coughing.     Initial BP (!) 149/80 (BP Location: Right arm, Patient Position: Sitting, Cuff Size: Adult Regular)   Pulse 79   Temp 98.7  F (37.1  C) (Temporal)   Resp 18   Ht 1.651 m (5' 5\")   Wt 71.8 kg (158 lb 3.2 oz)   SpO2 95%   BMI 26.33 kg/m   Estimated body mass index is 26.33 kg/m  as calculated from the following:    Height as of this encounter: 1.651 m (5' 5\").    Weight as of this encounter: 71.8 kg (158 lb 3.2 oz).  Medication Review: complete    The next two questions are to help us understand your food security.  If you are feeling you need any assistance in this area, we have resources available to support you today.          1/20/2025   SDOH- Food Insecurity   Within the past 12 months, did you worry that your food would run out before you got money to buy more? N   Within the past 12 months, did the food you bought just not last and you didn t have money to get more? N         Health Care Directive:  Patient does not have a Health Care Directive: Discussed advance care planning with patient; however, patient declined at this time.    James Polanco      "

## (undated) RX ORDER — ONDANSETRON 2 MG/ML
INJECTION INTRAMUSCULAR; INTRAVENOUS
Status: DISPENSED
Start: 2019-03-18

## (undated) RX ORDER — LIDOCAINE HYDROCHLORIDE 10 MG/ML
INJECTION, SOLUTION INFILTRATION; PERINEURAL
Status: DISPENSED
Start: 2020-02-24

## (undated) RX ORDER — MORPHINE SULFATE 4 MG/ML
INJECTION, SOLUTION INTRAMUSCULAR; INTRAVENOUS
Status: DISPENSED
Start: 2019-03-18

## (undated) RX ORDER — SODIUM CHLORIDE 9 MG/ML
INJECTION, SOLUTION INTRAVENOUS
Status: DISPENSED
Start: 2019-03-18

## (undated) RX ORDER — KETOROLAC TROMETHAMINE 15 MG/ML
INJECTION, SOLUTION INTRAMUSCULAR; INTRAVENOUS
Status: DISPENSED
Start: 2019-03-18